# Patient Record
Sex: MALE | Race: WHITE | NOT HISPANIC OR LATINO | URBAN - METROPOLITAN AREA
[De-identification: names, ages, dates, MRNs, and addresses within clinical notes are randomized per-mention and may not be internally consistent; named-entity substitution may affect disease eponyms.]

---

## 2017-05-22 ENCOUNTER — OUTPATIENT (OUTPATIENT)
Dept: OUTPATIENT SERVICES | Facility: HOSPITAL | Age: 47
LOS: 1 days | End: 2017-05-22
Payer: MEDICAID

## 2017-05-22 ENCOUNTER — APPOINTMENT (OUTPATIENT)
Dept: CT IMAGING | Facility: CLINIC | Age: 47
End: 2017-05-22

## 2017-05-22 DIAGNOSIS — R91.8 OTHER NONSPECIFIC ABNORMAL FINDING OF LUNG FIELD: ICD-10-CM

## 2017-05-22 DIAGNOSIS — Q53.9 UNDESCENDED TESTICLE, UNSPECIFIED: Chronic | ICD-10-CM

## 2017-05-22 PROCEDURE — 71250 CT THORAX DX C-: CPT

## 2023-03-31 ENCOUNTER — OFFICE VISIT (OUTPATIENT)
Dept: URGENT CARE | Facility: CLINIC | Age: 53
End: 2023-03-31

## 2023-03-31 VITALS
DIASTOLIC BLOOD PRESSURE: 92 MMHG | WEIGHT: 225 LBS | BODY MASS INDEX: 30.48 KG/M2 | HEIGHT: 72 IN | OXYGEN SATURATION: 98 % | HEART RATE: 77 BPM | SYSTOLIC BLOOD PRESSURE: 190 MMHG | TEMPERATURE: 98.2 F

## 2023-03-31 DIAGNOSIS — S61.210A LACERATION OF RIGHT INDEX FINGER WITHOUT FOREIGN BODY WITHOUT DAMAGE TO NAIL, INITIAL ENCOUNTER: Primary | ICD-10-CM

## 2023-03-31 RX ORDER — CEPHALEXIN 500 MG/1
500 CAPSULE ORAL EVERY 12 HOURS SCHEDULED
Qty: 14 CAPSULE | Refills: 0 | Status: SHIPPED | OUTPATIENT
Start: 2023-03-31 | End: 2023-03-31

## 2023-03-31 RX ORDER — CEPHALEXIN 500 MG/1
500 CAPSULE ORAL EVERY 12 HOURS SCHEDULED
Qty: 14 CAPSULE | Refills: 0 | Status: SHIPPED | OUTPATIENT
Start: 2023-03-31 | End: 2023-04-07

## 2023-03-31 NOTE — PROGRESS NOTES
[facility]        NAME: Anand Jacob is a 46 y o  male  : 1970    MRN: [id number]  DATE: 2023  TIME: 3:47 PM    Assessment and Orders   Laceration of right index finger without foreign body without damage to nail, initial encounter [S61 210A]  1  Laceration of right index finger without foreign body without damage to nail, initial encounter  Tdap Vaccine greater than or equal to 8yo    cephalexin (KEFLEX) 500 mg capsule    DISCONTINUED: cephalexin (KEFLEX) 500 mg capsule            Plan and Discussion      Symptoms and exam consistent with laceration of the right index finger  Bleeding controlled with topical lido with epi, dermabond and pressure  Given patient has diabetes and therefore is at higher risk for complication from infection, with prophlaxtically treat with oral Keflex  Tdap given  Patient to keep wound dry and wrapped for 48 hours  He should have wound checked by PCP in 3-5 days  Discussed signs of infection and encouraged patient be seen sooner should these arise  Discussed ED precautions including (but not limited to)  • Difficultly breathing or shortness of breath  • Chest pain  • Acutely worsening symptoms  Risks and benefits discussed  Patient understands and agrees with the plan  Follow up with PCP  Chief Complaint     Chief Complaint   Patient presents with   • laceration finger     Right hand pointer  2 hrs ago  Radiator fan on his vehicle as he was doing maintenance on it was turned on and the fan caught and sliced the tip of the pointer finger  History of Present Illness       HPI     Patient cut his right index finger on a radiator fan of a car approx 2 hours before arrival  He does take Clopidogrel as he is s/p MI  Could not get the bleeding to stop which prompted his visit  Review of Systems   Review of Systems   Skin: Positive for wound           Current Medications       Current Outpatient Medications:   •  cephalexin (KEFLEX) 500 mg capsule, Take 1 capsule (500 mg total) by mouth every 12 (twelve) hours for 7 days, Disp: 14 capsule, Rfl: 0    Current Allergies     Allergies as of 03/31/2023 - never reviewed   Allergen Reaction Noted   • Penicillins Other (See Comments) 03/31/2023            The following portions of the patient's history were reviewed and updated as appropriate: allergies, current medications, past family history, past medical history, past social history, past surgical history and problem list      Past Medical History:   Diagnosis Date   • Diabetes mellitus ([facility] [address] )        Past Surgical History:   Procedure Laterality Date   • ROTATOR CUFF REPAIR Left        Family History   Problem Relation Age of Onset   • Heart disease Mother    • Heart disease Father    • Stroke Father    • Diabetes Father    • Stroke Brother    • Heart disease Brother          Medications have been verified  Objective   BP (!) 190/92   Pulse 77   Temp 98 2 °F (36 8 °C)   Ht 6' (1 829 m)   Wt 102 kg (225 lb)   SpO2 98%   BMI 30 52 kg/m²   No LMP for male patient  Physical Exam     Physical Exam  HENT:      Head:      Comments: Significant scarring on forehead from prior injury  Musculoskeletal:        Hands:    Neurological:      General: No focal deficit present  Mental Status: He is alert and oriented to person, place, and time     Psychiatric:         Mood and Affect: Mood normal          Behavior: Behavior normal                  Torres Mac DO     Laceration repair    Date/Time: 3/31/2023 3:23 PM  Performed by: Delilah Lau DO  Authorized by: Dleilah Lau DO   Patient identity confirmed: verbally with patient  Body area: upper extremity  Location details: right index finger  Laceration length: 1 cm  Tendon involvement: none  Nerve involvement: none  Vascular damage: no  Anesthesia: local infiltration    Anesthesia:  Local Anesthetic: lidocaine 1% with epinephrine  Anesthetic total: 2 mL    Sedation:  Patient sedated: no      Wound Dehiscence:  Superficial Wound Dehiscence: simple closure      Procedure Details:  Irrigation solution: saline  Irrigation method: jet lavage  Amount of cleaning: standard  Debridement: minimal  Skin closure: glue

## 2023-04-03 ENCOUNTER — OFFICE VISIT (OUTPATIENT)
Dept: URGENT CARE | Facility: CLINIC | Age: 53
End: 2023-04-03

## 2023-04-03 VITALS
HEART RATE: 74 BPM | TEMPERATURE: 97.7 F | OXYGEN SATURATION: 97 % | DIASTOLIC BLOOD PRESSURE: 82 MMHG | RESPIRATION RATE: 16 BRPM | BODY MASS INDEX: 30.52 KG/M2 | WEIGHT: 225 LBS | SYSTOLIC BLOOD PRESSURE: 150 MMHG

## 2023-04-03 DIAGNOSIS — S61.210D LACERATION OF RIGHT INDEX FINGER WITHOUT FOREIGN BODY WITHOUT DAMAGE TO NAIL, SUBSEQUENT ENCOUNTER: Primary | ICD-10-CM

## 2023-04-03 RX ORDER — PANTOPRAZOLE SODIUM 40 MG/1
40 TABLET, DELAYED RELEASE ORAL DAILY
COMMUNITY
Start: 2022-12-02

## 2023-04-03 RX ORDER — ATORVASTATIN CALCIUM 80 MG/1
80 TABLET, FILM COATED ORAL DAILY
COMMUNITY
Start: 2023-01-06

## 2023-04-03 RX ORDER — ASPIRIN 81 MG/1
81 TABLET, COATED ORAL EVERY MORNING
COMMUNITY
Start: 2023-03-10

## 2023-04-03 RX ORDER — CLOPIDOGREL BISULFATE 75 MG/1
75 TABLET ORAL DAILY
COMMUNITY
Start: 2023-02-08

## 2023-04-03 RX ORDER — METOPROLOL SUCCINATE 25 MG/1
25 TABLET, EXTENDED RELEASE ORAL DAILY
COMMUNITY
Start: 2023-01-25

## 2023-04-03 RX ORDER — GLIPIZIDE 10 MG/1
TABLET, FILM COATED, EXTENDED RELEASE ORAL
COMMUNITY
Start: 2023-01-31

## 2023-04-03 RX ORDER — ALBUTEROL SULFATE 90 UG/1
2 AEROSOL, METERED RESPIRATORY (INHALATION)
COMMUNITY
Start: 2022-10-11

## 2023-04-03 RX ORDER — DIVALPROEX SODIUM 250 MG/1
TABLET, DELAYED RELEASE ORAL
COMMUNITY
Start: 2023-03-04

## 2023-04-03 NOTE — PROGRESS NOTES
[facility]        NAME: Anand Jacob is a 46 y o  male  : 1970    MRN: [id number]  DATE: April 3, 2023  TIME: 11:13 AM    Assessment and Plan   Laceration of right index finger without foreign body without damage to nail, subsequent encounter [Q60 140I]  1  Laceration of right index finger without foreign body without damage to nail, subsequent encounter              Patient Instructions   Patient Instructions   Follow-up with your primary care provider in the next 3-5 days  Any new or worsening symptoms develop get re-evaluated sooner or proceed to the ER  Follow up with PCP in 3-5 days  Proceed to  ER if symptoms worsen  Chief Complaint     Chief Complaint   Patient presents with   • Wound Check     Right forefinger  No fever or chills  History of Present Illness       Presents for a wound check of his right index finger  Denies redness, drainage, swelling, numbness/tingling, or concerns over the area  Review of Systems   Review of Systems   Constitutional: Negative for fatigue and fever  Skin: Positive for wound  Negative for rash  Neurological: Negative for weakness and numbness  Current Medications       Current Outpatient Medications:   •  albuterol (PROVENTIL HFA,VENTOLIN HFA) 90 mcg/act inhaler, Inhale 2 puffs, Disp: , Rfl:   •  Aspirin Low Dose 81 MG EC tablet, Take 81 mg by mouth every morning, Disp: , Rfl:   •  atorvastatin (LIPITOR) 80 mg tablet, Take 80 mg by mouth daily, Disp: , Rfl:   •  cephalexin (KEFLEX) 500 mg capsule, Take 1 capsule (500 mg total) by mouth every 12 (twelve) hours for 7 days, Disp: 14 capsule, Rfl: 0  •  clopidogrel (PLAVIX) 75 mg tablet, Take 75 mg by mouth daily, Disp: , Rfl:   •  divalproex sodium (DEPAKOTE) 250 mg EC tablet, TAKE 1 TABLET BY MOUTH EVERY DAY IN THE MORNING AND BEFORE BEDTIME, Disp: , Rfl:   •  glipiZIDE (GLUCOTROL XL) 10 mg 24 hr tablet, TAKE 1 TAB BY MOUTH 2 TIMES A DAY  30 MINUTES BEFORE A MEAL  , Disp: , Rfl:   •  metoprolol succinate (TOPROL-XL) 25 mg 24 hr tablet, Take 25 mg by mouth daily, Disp: , Rfl:   •  pantoprazole (PROTONIX) 40 mg tablet, Take 40 mg by mouth daily, Disp: , Rfl:     Current Allergies     Allergies as of 04/03/2023 - Reviewed 04/03/2023   Allergen Reaction Noted   • Penicillins Other (See Comments) 03/31/2023            The following portions of the patient's history were reviewed and updated as appropriate: allergies, current medications, past family history, past medical history, past social history, past surgical history and problem list      Past Medical History:   Diagnosis Date   • Diabetes mellitus ([facility] [address] )        Past Surgical History:   Procedure Laterality Date   • ROTATOR CUFF REPAIR Left        Family History   Problem Relation Age of Onset   • Heart disease Mother    • Heart disease Father    • Stroke Father    • Diabetes Father    • Stroke Brother    • Heart disease Brother          Medications have been verified  Objective   /82   Pulse 74   Temp 97 7 °F (36 5 °C)   Resp 16   Wt 102 kg (225 lb)   SpO2 97%   BMI 30 52 kg/m²        Physical Exam     Physical Exam  Constitutional:       Appearance: Normal appearance  Musculoskeletal:         General: Normal range of motion  Skin:     Comments: Well healing laceration over the tip of the right index finger  No surrounding erythema, swelling, or drainage noted  NV intact distally  Neurological:      Mental Status: He is alert     Psychiatric:         Mood and Affect: Mood normal          Behavior: Behavior normal

## 2023-10-21 ENCOUNTER — APPOINTMENT (EMERGENCY)
Dept: RADIOLOGY | Facility: HOSPITAL | Age: 53
End: 2023-10-21
Payer: COMMERCIAL

## 2023-10-21 ENCOUNTER — HOSPITAL ENCOUNTER (EMERGENCY)
Facility: HOSPITAL | Age: 53
Discharge: HOME | End: 2023-10-21
Attending: EMERGENCY MEDICINE | Admitting: EMERGENCY MEDICINE
Payer: COMMERCIAL

## 2023-10-21 VITALS
RESPIRATION RATE: 18 BRPM | SYSTOLIC BLOOD PRESSURE: 190 MMHG | OXYGEN SATURATION: 97 % | HEART RATE: 80 BPM | DIASTOLIC BLOOD PRESSURE: 84 MMHG

## 2023-10-21 DIAGNOSIS — S83.91XA RIGHT KNEE SPRAIN: Primary | ICD-10-CM

## 2023-10-21 PROCEDURE — 73564 X-RAY EXAM KNEE 4 OR MORE: CPT

## 2023-10-21 PROCEDURE — 99284 EMERGENCY DEPT VISIT MOD MDM: CPT | Performed by: PHYSICIAN ASSISTANT

## 2023-10-21 PROCEDURE — 99283 EMERGENCY DEPT VISIT LOW MDM: CPT

## 2023-10-21 NOTE — ED PROVIDER NOTES
History  Chief Complaint   Patient presents with    Knee Injury     Patient reports right knee pain after trip and fall onto right knee out of car today. Denies head injury or LOC. 45 yo male pt with right knee injury. Foot was caught in a ditch/hole while walking. Twisted the knee. Pain since then. Was able to bear weight initially but now unable to due to pain. No numbness, tingling, weakness. History provided by:  Patient   used: No        Prior to Admission Medications   Prescriptions Last Dose Informant Patient Reported? Taking? Aspirin Low Dose 81 MG EC tablet   Yes No   Sig: Take 81 mg by mouth every morning   albuterol (PROVENTIL HFA,VENTOLIN HFA) 90 mcg/act inhaler   Yes No   Sig: Inhale 2 puffs   atorvastatin (LIPITOR) 80 mg tablet   Yes No   Sig: Take 80 mg by mouth daily   clopidogrel (PLAVIX) 75 mg tablet   Yes No   Sig: Take 75 mg by mouth daily   divalproex sodium (DEPAKOTE) 250 mg EC tablet   Yes No   Sig: TAKE 1 TABLET BY MOUTH EVERY DAY IN THE MORNING AND BEFORE BEDTIME   glipiZIDE (GLUCOTROL XL) 10 mg 24 hr tablet   Yes No   Sig: TAKE 1 TAB BY MOUTH 2 TIMES A DAY. 30 MINUTES BEFORE A MEAL. metoprolol succinate (TOPROL-XL) 25 mg 24 hr tablet   Yes No   Sig: Take 25 mg by mouth daily   pantoprazole (PROTONIX) 40 mg tablet   Yes No   Sig: Take 40 mg by mouth daily      Facility-Administered Medications: None       Past Medical History:   Diagnosis Date    Diabetes mellitus ([address])        Past Surgical History:   Procedure Laterality Date    ROTATOR CUFF REPAIR Left        Family History   Problem Relation Age of Onset    Heart disease Mother     Heart disease Father     Stroke Father     Diabetes Father     Stroke Brother     Heart disease Brother      I have reviewed and agree with the history as documented.     E-Cigarette/Vaping    E-Cigarette Use Never User      E-Cigarette/Vaping Substances     Social History     Tobacco Use    Smoking status: Every Day Packs/day: 1.00     Types: Cigarettes    Smokeless tobacco: Never   Vaping Use    Vaping Use: Never used   Substance Use Topics    Alcohol use: Not Currently    Drug use: Yes     Types: Marijuana       Review of Systems   Constitutional:  Negative for chills and fever. HENT:  Negative for ear pain and sore throat. Eyes:  Negative for pain and visual disturbance. Respiratory:  Negative for cough and shortness of breath. Cardiovascular:  Negative for chest pain and palpitations. Gastrointestinal:  Negative for abdominal pain and vomiting. Genitourinary:  Negative for dysuria and hematuria. Musculoskeletal:  Negative for arthralgias and back pain. Right knee injury     Skin:  Negative for color change and rash. Neurological:  Negative for seizures and syncope. All other systems reviewed and are negative. Physical Exam  Physical Exam  Vitals and nursing note reviewed. Constitutional:       General: He is not in acute distress. Appearance: He is well-developed. HENT:      Head: Normocephalic and atraumatic. Eyes:      Conjunctiva/sclera: Conjunctivae normal.   Cardiovascular:      Rate and Rhythm: Normal rate and regular rhythm. Heart sounds: No murmur heard. Pulmonary:      Effort: Pulmonary effort is normal. No respiratory distress. Breath sounds: Normal breath sounds. Abdominal:      Palpations: Abdomen is soft. Tenderness: There is no abdominal tenderness. Musculoskeletal:         General: No swelling. Cervical back: Neck supple. Right knee: Tenderness present over the medial joint line. No lateral joint line tenderness. Legs:    Skin:     General: Skin is warm and dry. Capillary Refill: Capillary refill takes less than 2 seconds. Neurological:      Mental Status: He is alert.    Psychiatric:         Mood and Affect: Mood normal.         Vital Signs  ED Triage Vitals [10/21/23 1845]   Temp Pulse Respirations Blood Pressure SpO2   -- 80 18 (!) 190/84 97 %      Temp src Heart Rate Source Patient Position - Orthostatic VS BP Location FiO2 (%)   -- -- -- -- --      Pain Score       --           Vitals:    10/21/23 1845   BP: (!) 190/84   Pulse: 80         Visual Acuity      ED Medications  Medications - No data to display    Diagnostic Studies  Results Reviewed       None                   XR knee 4+ vw right injury   ED Interpretation by Todd Bello PA-C (10/21 1902)   No acute osseous abnormalities. Procedures  Procedures         ED Course                               SBIRT 20yo+      Flowsheet Row Most Recent Value   Initial Alcohol Screen: US AUDIT-C     1. How often do you have a drink containing alcohol? 0 Filed at: 10/21/2023 1846   2. How many drinks containing alcohol do you have on a typical day you are drinking? 0 Filed at: 10/21/2023 1846   3a. Male UNDER 65: How often do you have five or more drinks on one occasion? 0 Filed at: 10/21/2023 1846   3b. FEMALE Any Age, or MALE 65+: How often do you have 4 or more drinks on one occassion? 0 Filed at: 10/21/2023 1846   Audit-C Score 0 Filed at: 10/21/2023 1846   LOLY: How many times in the past year have you. .. Used an illegal drug or used a prescription medication for non-medical reasons? Never Filed at: 10/21/2023 1846                      Medical Decision Making  Differential diagnosis including but not limited to: sprain, strain, fracture, dislocation, contusion; doubt compartment syndrome. Plan: XR.     MDM: 47 yo with knee injury. XR negative. Difficulty ranging it at this time which could be 2/2 pain however cannot r/o ligamentous injury. Recommended crutches, immobilizer, NWB and ortho f/u. Return parameters provided. Pt understands and agrees with plan. Amount and/or Complexity of Data Reviewed  Radiology: ordered and independent interpretation performed.              Disposition  Final diagnoses:   Right knee sprain     Time reflects when diagnosis was documented in both MDM as applicable and the Disposition within this note       Time User Action Codes Description Comment    10/21/2023  7:03 PM Clair Puentes Add [J64.16MU] Right knee sprain           ED Disposition       ED Disposition   Discharge    Condition   Stable    Date/Time   Sat Oct 21, 2023 1903    [place] discharge to home/self care. Follow-up Information       Follow up With Specialties Details Why Contact Info Additional Information    [address] [facility]   [address] [id number]  [facility], [address] [place] [address], [address] [place] [address]            Discharge Medication List as of 10/21/2023  7:03 PM        CONTINUE these medications which have NOT CHANGED    Details   albuterol (PROVENTIL HFA,VENTOLIN HFA) 90 mcg/act inhaler Inhale 2 puffs, Starting Tue 10/11/2022, Historical Med      Aspirin Low Dose 81 MG EC tablet Take 81 mg by mouth every morning, Starting Fri 3/10/2023, Historical Med      atorvastatin (LIPITOR) 80 mg tablet Take 80 mg by mouth daily, Starting Fri 1/6/2023, Historical Med      clopidogrel (PLAVIX) 75 mg tablet Take 75 mg by mouth daily, Starting Wed 2/8/2023, Historical Med      divalproex sodium (DEPAKOTE) 250 mg EC tablet TAKE 1 TABLET BY MOUTH EVERY DAY IN THE MORNING AND BEFORE BEDTIME, Historical Med      glipiZIDE (GLUCOTROL XL) 10 mg 24 hr tablet TAKE 1 TAB BY MOUTH 2 TIMES A DAY. 30 MINUTES BEFORE A MEAL. , Historical Med      metoprolol succinate (TOPROL-XL) 25 mg 24 hr tablet Take 25 mg by mouth daily, Starting Wed 1/25/2023, Historical Med      pantoprazole (PROTONIX) 40 mg tablet Take 40 mg by mouth daily, Starting Fri 12/2/2022, Historical Med             No discharge procedures on file.     PDMP Review       None            ED Provider  Electronically Signed by             Faisal Chavez PA-C  10/21/23 1946